# Patient Record
Sex: FEMALE | Race: WHITE | NOT HISPANIC OR LATINO
[De-identification: names, ages, dates, MRNs, and addresses within clinical notes are randomized per-mention and may not be internally consistent; named-entity substitution may affect disease eponyms.]

---

## 2024-07-11 ENCOUNTER — DASHBOARD ENCOUNTERS (OUTPATIENT)
Age: 44
End: 2024-07-11

## 2024-07-11 ENCOUNTER — OFFICE VISIT (OUTPATIENT)
Dept: URBAN - METROPOLITAN AREA CLINIC 12 | Facility: CLINIC | Age: 44
End: 2024-07-11
Payer: COMMERCIAL

## 2024-07-11 ENCOUNTER — TELEPHONE ENCOUNTER (OUTPATIENT)
Dept: URBAN - METROPOLITAN AREA CLINIC 6 | Facility: CLINIC | Age: 44
End: 2024-07-11

## 2024-07-11 ENCOUNTER — LAB OUTSIDE AN ENCOUNTER (OUTPATIENT)
Dept: URBAN - METROPOLITAN AREA CLINIC 12 | Facility: CLINIC | Age: 44
End: 2024-07-11

## 2024-07-11 VITALS
HEART RATE: 77 BPM | SYSTOLIC BLOOD PRESSURE: 115 MMHG | TEMPERATURE: 98.8 F | HEIGHT: 63 IN | DIASTOLIC BLOOD PRESSURE: 69 MMHG | BODY MASS INDEX: 32.85 KG/M2 | WEIGHT: 185.4 LBS

## 2024-07-11 DIAGNOSIS — R14.2 BELCHING: ICD-10-CM

## 2024-07-11 DIAGNOSIS — R10.13 EPIGASTRIC PAIN: ICD-10-CM

## 2024-07-11 DIAGNOSIS — R11.0 NAUSEA: ICD-10-CM

## 2024-07-11 DIAGNOSIS — R10.84 GENERALIZED POSTPRANDIAL ABDOMINAL PAIN: ICD-10-CM

## 2024-07-11 PROBLEM — 271834000: Status: ACTIVE | Noted: 2024-07-11

## 2024-07-11 PROBLEM — 422587007: Status: ACTIVE | Noted: 2024-07-11

## 2024-07-11 PROBLEM — 102614006: Status: ACTIVE | Noted: 2024-07-11

## 2024-07-11 PROBLEM — 79922009: Status: ACTIVE | Noted: 2024-07-11

## 2024-07-11 PROBLEM — 691501000119103: Status: ACTIVE | Noted: 2024-07-11

## 2024-07-11 PROCEDURE — 99213 OFFICE O/P EST LOW 20 MIN: CPT

## 2024-07-11 RX ORDER — SUCRALFATE 1 G/10ML
10 ML 1 HOUR BEFORE MEALS AND AT BEDTIME ON AN EMPTY STOMACH SUSPENSION ORAL
Qty: 1200 | Refills: 0 | OUTPATIENT
Start: 2024-07-11 | End: 2024-08-10

## 2024-07-11 RX ORDER — OMEPRAZOLE 40 MG/1
1 CAPSULE 30 MINUTES BEFORE MORNING MEAL CAPSULE, DELAYED RELEASE ORAL ONCE A DAY
Qty: 90 | Refills: 3 | OUTPATIENT
Start: 2024-07-11

## 2024-07-11 NOTE — HPI-TODAY'S VISIT:
Pt is a 45 yo female presents for increased belching, bloating and post-prandial epigastric pain. She reports having chronic GERD and hx of gastritis with frequent "pain attacks from gastritis".  She states she's been ahving epigastric pain especially after she eats for the past 9 days. Her symptoms are more severe than episodes in the past. She has sharp epigastric pain with radiation to the back for upto 45 minutes after a meal. Reports increased belching, bloating and nausea. Has been taking Gas X and dicyclomine w/o symptom relief. She started eating baby food on Monday which seems to help. It does not happen with every meal. Denies frequent NSAIDs use, alcohol or smoking. Denies hearburn, reflux, dysphagia, odynophagia, sore throat, vomiting or melena. No problems with BM, Last EGD in 2018 revealing mild chronic gastritis. No evidence of celiac sprue or H. pylori.

## 2024-07-17 ENCOUNTER — TELEPHONE ENCOUNTER (OUTPATIENT)
Dept: URBAN - METROPOLITAN AREA CLINIC 12 | Facility: CLINIC | Age: 44
End: 2024-07-17

## 2024-07-17 PROBLEM — 235919008: Status: ACTIVE | Noted: 2024-07-17

## 2024-07-23 ENCOUNTER — TELEPHONE ENCOUNTER (OUTPATIENT)
Dept: URBAN - METROPOLITAN AREA CLINIC 6 | Facility: CLINIC | Age: 44
End: 2024-07-23

## 2024-08-19 ENCOUNTER — TELEPHONE ENCOUNTER (OUTPATIENT)
Dept: URBAN - METROPOLITAN AREA CLINIC 23 | Facility: CLINIC | Age: 44
End: 2024-08-19

## 2024-08-19 ENCOUNTER — OFFICE VISIT (OUTPATIENT)
Dept: URBAN - METROPOLITAN AREA SURGERY CENTER 15 | Facility: SURGERY CENTER | Age: 44
End: 2024-08-19
Payer: COMMERCIAL

## 2024-08-19 DIAGNOSIS — K31.89 OTHER DISEASES OF STOMACH AND DUODENUM: ICD-10-CM

## 2024-08-19 DIAGNOSIS — R10.13 ABDOMINAL DISCOMFORT, EPIGASTRIC: ICD-10-CM

## 2024-08-19 DIAGNOSIS — R11.0 AM NAUSEA: ICD-10-CM

## 2024-08-19 PROCEDURE — 43239 EGD BIOPSY SINGLE/MULTIPLE: CPT | Performed by: INTERNAL MEDICINE

## 2024-08-19 PROCEDURE — 00731 ANES UPR GI NDSC PX NOS: CPT | Performed by: NURSE ANESTHETIST, CERTIFIED REGISTERED

## 2024-08-19 RX ORDER — OMEPRAZOLE 40 MG/1
1 CAPSULE 30 MINUTES BEFORE MORNING MEAL CAPSULE, DELAYED RELEASE ORAL ONCE A DAY
Qty: 90 | Refills: 3 | Status: ACTIVE | COMMUNITY
Start: 2024-07-11

## 2024-09-20 ENCOUNTER — TELEPHONE ENCOUNTER (OUTPATIENT)
Dept: URBAN - METROPOLITAN AREA CLINIC 6 | Facility: CLINIC | Age: 44
End: 2024-09-20